# Patient Record
Sex: FEMALE | Race: WHITE | NOT HISPANIC OR LATINO
[De-identification: names, ages, dates, MRNs, and addresses within clinical notes are randomized per-mention and may not be internally consistent; named-entity substitution may affect disease eponyms.]

---

## 2019-04-29 ENCOUNTER — APPOINTMENT (OUTPATIENT)
Dept: GERIATRICS | Facility: CLINIC | Age: 84
End: 2019-04-29
Payer: MEDICARE

## 2019-04-29 PROBLEM — Z00.00 ENCOUNTER FOR PREVENTIVE HEALTH EXAMINATION: Status: ACTIVE | Noted: 2019-04-29

## 2019-04-29 PROCEDURE — 99213 OFFICE O/P EST LOW 20 MIN: CPT

## 2019-04-29 NOTE — PHYSICAL EXAM
[General Appearance - Alert] : alert [General Appearance - In No Acute Distress] : in no acute distress [General Appearance - Well Nourished] : well nourished [General Appearance - Well Developed] : well developed [Sclera] : the sclera and conjunctiva were normal [PERRL With Normal Accommodation] : pupils were equal in size, round, and reactive to light [Tissue Injury Abrasion] : wound was noted [___cm] : a [unfilled] cm [Location] : which was located [Lesions Elbows Left] : on the left elbow [Skin Injury 1] : Skin injury: [Details] : the wound [FreeTextEntry2] : light slough in wound bed (25% slough, 75% grnulation) [FreeTextEntry3] : intact margins, intact periwound, fading ecchymosis to elbow

## 2019-04-29 NOTE — HISTORY OF PRESENT ILLNESS
[FreeTextEntry1] : Tripped and fell one week ago with skin tear left elbow\par has been coming to [facility] for every 2 to 3 days for wound\par care. Using xeroform\par No pain, no fever

## 2019-04-29 NOTE — ASSESSMENT
[FreeTextEntry1] : Change to medihoney daily\par Instructed pt on how to do this independently.\par Although she does have a tremor, she is a&O and was able to return\par demonstrate satisfactorily

## 2019-04-30 ENCOUNTER — CHART COPY (OUTPATIENT)
Age: 84
End: 2019-04-30

## 2019-04-30 DIAGNOSIS — Z86.69 PERSONAL HISTORY OF OTHER DISEASES OF THE NERVOUS SYSTEM AND SENSE ORGANS: ICD-10-CM

## 2019-05-01 ENCOUNTER — APPOINTMENT (OUTPATIENT)
Dept: GERIATRICS | Facility: CLINIC | Age: 84
End: 2019-05-01
Payer: MEDICARE

## 2019-05-01 VITALS — SYSTOLIC BLOOD PRESSURE: 140 MMHG | RESPIRATION RATE: 18 BRPM | HEART RATE: 60 BPM | DIASTOLIC BLOOD PRESSURE: 70 MMHG

## 2019-05-01 PROCEDURE — 99213 OFFICE O/P EST LOW 20 MIN: CPT

## 2019-05-03 NOTE — PHYSICAL EXAM
[General Appearance - Alert] : alert [Heart Rate And Rhythm] : heart rate was normal and rhythm regular [General Appearance - In No Acute Distress] : in no acute distress [Heart Sounds] : normal S1 and S2 [Skin Injury 1] : Skin injury: [Tissue Injury Abrasion] : wound was noted [Location] : which was located [Lesions Elbows Left] : on the left elbow [___cm] : a [unfilled] cm [Details] : the wound [FreeTextEntry2] : 95% granulation, improved [FreeTextEntry3] : intact margins, intact periwound, fading ecchymosis to elbow

## 2019-05-03 NOTE — ASSESSMENT
[FreeTextEntry1] : Cleansed wound which has responded well to gentle debridement and is smaller in size\par Pt instructed on wound care again and can do this independently , may shower\par call NP if any concerns or slough returns\par She verbalized understanding

## 2019-05-06 ENCOUNTER — APPOINTMENT (OUTPATIENT)
Dept: GERIATRICS | Facility: CLINIC | Age: 84
End: 2019-05-06
Payer: MEDICARE

## 2019-05-06 VITALS — DIASTOLIC BLOOD PRESSURE: 70 MMHG | RESPIRATION RATE: 20 BRPM | SYSTOLIC BLOOD PRESSURE: 130 MMHG | HEART RATE: 66 BPM

## 2019-05-06 PROCEDURE — 99213 OFFICE O/P EST LOW 20 MIN: CPT

## 2019-05-06 NOTE — PHYSICAL EXAM
[General Appearance - Alert] : alert [General Appearance - In No Acute Distress] : in no acute distress [Skin Injury 1] : Skin injury: [Tissue Injury Abrasion] : wound was noted [___cm] : a [unfilled] cm [Location] : which was located [Lesions Elbows Left] : on the left elbow [Details] : the wound [FreeTextEntry2] : 100% granulation [FreeTextEntry3] : intact margins, intact periwound

## 2019-05-06 NOTE — ASSESSMENT
[FreeTextEntry1] : Cleanse with NS, apply bacitracin cover with dpd. Pt can do this independently. Wound is nearly healed

## 2019-10-15 ENCOUNTER — APPOINTMENT (OUTPATIENT)
Dept: GERIATRICS | Facility: CLINIC | Age: 84
End: 2019-10-15

## 2019-10-28 ENCOUNTER — APPOINTMENT (OUTPATIENT)
Dept: GERIATRICS | Facility: CLINIC | Age: 84
End: 2019-10-28
Payer: MEDICARE

## 2019-10-28 VITALS
DIASTOLIC BLOOD PRESSURE: 70 MMHG | TEMPERATURE: 97.9 F | HEART RATE: 66 BPM | SYSTOLIC BLOOD PRESSURE: 160 MMHG | RESPIRATION RATE: 20 BRPM
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
## 2019-10-28 DIAGNOSIS — S51.002A UNSPECIFIED OPEN WOUND OF LEFT ELBOW, INITIAL ENCOUNTER: ICD-10-CM

## 2019-10-28 PROCEDURE — 99213 OFFICE O/P EST LOW 20 MIN: CPT

## 2019-10-28 RX ORDER — BISMUTH SUBSALICYLATE 262 MG
262 TABLET,CHEWABLE ORAL 3 TIMES DAILY
Qty: 1 | Refills: 2 | Status: COMPLETED | COMMUNITY
Start: 2019-10-28 | End: 2019-11-18

## 2019-10-28 NOTE — HISTORY OF PRESENT ILLNESS
[FreeTextEntry1] : Has been taking 1/2 tab 5mg oxy from 1-4 daily for\par compression fracture of upper back\par Has been to her pcp and a pain \par Took 2 Aleve this am and then started to have\par stomach pain\par

## 2019-10-28 NOTE — ASSESSMENT
[FreeTextEntry1] : Discussed with pt, s/e of Aleve and likely she is reacting to it now\par Also, s/e of Oxycodone and possiblity of addiction - to reduce and eliminate\par using it. May benefit from PT but should speak with her orthopedist and pain mgmt\par doctors about this.

## 2019-10-28 NOTE — REVIEW OF SYSTEMS
[As Noted in HPI] : as noted in HPI [Negative] : Respiratory [Fever] : no fever [Chills] : no chills [Chest Pain] : no chest pain [Palpitations] : no palpitations [FreeTextEntry5] : no arm or radiating pain [FreeTextEntry7] : has had 3-4 nl bm in the past 48 hrs. Does have abdominal nausea and discomfort.. one episode of vomiting [FreeTextEntry9] : Pain in upper left back due to compression fx

## 2019-10-28 NOTE — PHYSICAL EXAM
[General Appearance - Alert] : alert [General Appearance - In No Acute Distress] : in no acute distress [Sclera] : the sclera and conjunctiva were normal [PERRL With Normal Accommodation] : pupils were equal in size, round, and reactive to light [] : no respiratory distress [Respiration, Rhythm And Depth] : normal respiratory rhythm and effort [Apical Impulse] : the apical impulse was normal [Heart Rate And Rhythm] : heart rate was normal and rhythm regular [No CVA Tenderness] : no ~M costovertebral angle tenderness [FreeTextEntry1] : using a walker

## 2019-11-04 ENCOUNTER — APPOINTMENT (OUTPATIENT)
Dept: GERIATRICS | Facility: CLINIC | Age: 84
End: 2019-11-04
Payer: MEDICARE

## 2019-11-04 PROCEDURE — 90686 IIV4 VACC NO PRSV 0.5 ML IM: CPT

## 2019-11-04 PROCEDURE — G0008: CPT

## 2022-11-16 ENCOUNTER — APPOINTMENT (OUTPATIENT)
Dept: GERIATRICS | Facility: CLINIC | Age: 87
End: 2022-11-16

## 2022-11-16 ENCOUNTER — NON-APPOINTMENT (OUTPATIENT)
Age: 87
End: 2022-11-16

## 2022-11-16 VITALS
SYSTOLIC BLOOD PRESSURE: 162 MMHG | RESPIRATION RATE: 20 BRPM | BODY MASS INDEX: 21.97 KG/M2 | HEIGHT: 67 IN | HEART RATE: 70 BPM | TEMPERATURE: 97.5 F | DIASTOLIC BLOOD PRESSURE: 80 MMHG | WEIGHT: 140 LBS

## 2022-11-16 VITALS
RESPIRATION RATE: 20 BRPM | BODY MASS INDEX: 21.97 KG/M2 | WEIGHT: 140 LBS | SYSTOLIC BLOOD PRESSURE: 162 MMHG | TEMPERATURE: 97.5 F | HEART RATE: 70 BPM | HEIGHT: 67 IN | DIASTOLIC BLOOD PRESSURE: 80 MMHG

## 2022-11-16 DIAGNOSIS — Z23 ENCOUNTER FOR IMMUNIZATION: ICD-10-CM

## 2022-11-16 DIAGNOSIS — R23.8 OTHER SKIN CHANGES: ICD-10-CM

## 2022-11-16 DIAGNOSIS — Z87.898 PERSONAL HISTORY OF OTHER SPECIFIED CONDITIONS: ICD-10-CM

## 2022-11-16 DIAGNOSIS — M54.9 DORSALGIA, UNSPECIFIED: ICD-10-CM

## 2022-11-16 DIAGNOSIS — L71.9 ROSACEA, UNSPECIFIED: ICD-10-CM

## 2022-11-16 DIAGNOSIS — H04.123 DRY EYE SYNDROME OF BILATERAL LACRIMAL GLANDS: ICD-10-CM

## 2022-11-16 PROCEDURE — 99213 OFFICE O/P EST LOW 20 MIN: CPT

## 2022-11-16 RX ORDER — OXYCODONE 5 MG/1
5 TABLET ORAL
Qty: 120 | Refills: 0 | Status: DISCONTINUED | COMMUNITY
Start: 2019-10-18 | End: 2022-11-16

## 2022-11-16 RX ORDER — AMOXICILLIN 500 MG/1
500 CAPSULE ORAL
Qty: 20 | Refills: 0 | Status: ACTIVE | COMMUNITY
Start: 2022-06-27

## 2022-11-16 RX ORDER — OMEPRAZOLE MAGNESIUM 40 MG/1
40 CAPSULE, DELAYED RELEASE ORAL
Refills: 0 | Status: DISCONTINUED | COMMUNITY
End: 2022-11-16

## 2022-11-16 RX ORDER — ACETAMINOPHEN 500 MG/1
500 TABLET ORAL AS DIRECTED
Qty: 50 | Refills: 1 | Status: DISCONTINUED | COMMUNITY
Start: 2019-10-28 | End: 2022-11-16

## 2022-11-16 NOTE — PHYSICAL EXAM
[EOMI] : extraocular movements were intact [FreeTextEntry1] : mildly injected, a little watery diischarge [de-identified] : emerging pimple on tip of nose with dark erythema surrounding. Cheeks are reddened as well as forehead

## 2022-11-16 NOTE — ASSESSMENT
[FreeTextEntry1] : for pimple/infection on nose - mupirocin after warm compress x 7d\par changed her eyedrops to refresh from systane and feels they are not working as well \par rec change back to prior drops pt is seeing her opthalmologist in upcoming week\par does have f/u with her pcp\par

## 2022-11-17 ENCOUNTER — APPOINTMENT (OUTPATIENT)
Dept: GERIATRICS | Facility: CLINIC | Age: 87
End: 2022-11-17

## 2022-12-05 ENCOUNTER — RESULT REVIEW (OUTPATIENT)
Age: 87
End: 2022-12-05

## 2022-12-09 ENCOUNTER — TRANSCRIPTION ENCOUNTER (OUTPATIENT)
Age: 87
End: 2022-12-09

## 2022-12-21 DIAGNOSIS — M51.16 INTERVERTEBRAL DISC DISORDERS WITH RADICULOPATHY, LUMBAR REGION: ICD-10-CM

## 2022-12-23 ENCOUNTER — APPOINTMENT (OUTPATIENT)
Dept: PAIN MANAGEMENT | Facility: CLINIC | Age: 87
End: 2022-12-23

## 2022-12-28 ENCOUNTER — APPOINTMENT (OUTPATIENT)
Dept: PAIN MANAGEMENT | Facility: CLINIC | Age: 87
End: 2022-12-28

## 2023-03-23 ENCOUNTER — APPOINTMENT (OUTPATIENT)
Dept: GERIATRICS | Facility: CLINIC | Age: 88
End: 2023-03-23
Payer: MEDICARE

## 2023-03-23 VITALS
HEIGHT: 67 IN | OXYGEN SATURATION: 94 % | BODY MASS INDEX: 20.72 KG/M2 | TEMPERATURE: 97 F | HEART RATE: 76 BPM | SYSTOLIC BLOOD PRESSURE: 154 MMHG | DIASTOLIC BLOOD PRESSURE: 82 MMHG | WEIGHT: 132 LBS

## 2023-03-23 DIAGNOSIS — Z71.89 OTHER SPECIFIED COUNSELING: ICD-10-CM

## 2023-03-23 DIAGNOSIS — Z80.0 FAMILY HISTORY OF MALIGNANT NEOPLASM OF DIGESTIVE ORGANS: ICD-10-CM

## 2023-03-23 DIAGNOSIS — Z82.49 FAMILY HISTORY OF ISCHEMIC HEART DISEASE AND OTHER DISEASES OF THE CIRCULATORY SYSTEM: ICD-10-CM

## 2023-03-23 PROCEDURE — 99205 OFFICE O/P NEW HI 60 MIN: CPT | Mod: 25

## 2023-03-23 PROCEDURE — 99497 ADVNCD CARE PLAN 30 MIN: CPT

## 2023-03-23 NOTE — SOCIAL HISTORY
No [Alone] : lives alone [Assisted living] : in assisted living [FreeTextEntry1] : independent living at [facility]

## 2023-03-23 NOTE — HISTORY OF PRESENT ILLNESS
[No falls in past year] : Patient reported no falls in the past year [Completely Independent] : Completely independent. [Walker] : walker [Smoke Detector] : smoke detector [Carbon Monoxide Detector] : carbon monoxide detector [Stair Lift] : stair lift used in home [Grab Bars] : grab bars [Shower Chair] : shower chair [Night Light] : night light [Anti-Slip Measures] : anti-slip measures [Wears Seat Belt] : wears seat belt [0] : 2) Feeling down, depressed, or hopeless: Not at all (0) [PHQ-2 Negative - No further assessment needed] : PHQ-2 Negative - No further assessment needed [With Patient/Caregiver] : , with patient/caregiver [Reviewed updated] : Reviewed, updated [Designated Healthcare Proxy] : Designated healthcare proxy [DNR] : DNR [I will adhere to the patient's wishes.] : I will adhere to the patient's wishes. [Time Spent: ___ minutes] : Time Spent: [unfilled] minutes [FreeTextEntry1] : 89F PMH R eye melanoma establishing care.  PCP was Dr. Erica Goldberger at [facility].  She was very happy with care but as she lives in independent living  at [facility] she is looking for a physician who can admit on clearwater.  \par \par Melanoma was found by ophthalmologist ([place]) and now she annual surveillance of abdomen with an MRI of the abdomen.  \par Now seeing Dr Noel (eye cancer) [facility].\par \par Does use walker s/p 2 different compression fractures.  \par \par HCP\par son Dennis CT\par luis Fraser (NP) [place]\par \par 2 sons Bipin & Tyler both in [place]\par \par \par  [Driving Concerns] : not driving or driving without noted concerns [de-identified] : lives at [facility] [[id number]] : 0 [AdvancecareDate] : 03/23/23

## 2023-03-23 NOTE — ASSESSMENT
[FreeTextEntry1] : establishing care \par will need an annual once a year\par will need to get records from PCP and Dr Noel\krys MCDONALD updated - open to bipap and hospitalizations\par

## 2023-03-23 NOTE — REVIEW OF SYSTEMS
[Fever] : no fever [Chills] : no chills [Eyesight Problems] : no eyesight problems [Discharge From Eyes] : no purulent discharge from the eyes [Earache] : no earache [Nosebleeds] : no nosebleeds [Chest Pain] : no chest pain [Palpitations] : no palpitations [Cough] : no cough [SOB on Exertion] : no shortness of breath during exertion [Constipation] : no constipation [Diarrhea] : no diarrhea [Dysuria] : no dysuria [Incontinence] : no incontinence [Joint Swelling] : no joint swelling [Joint Stiffness] : no joint stiffness [Itching] : no itching [Change In A Mole] : no change in a mole [Confused] : no confusion [Convulsions] : no convulsions [Anxiety] : no anxiety [Depression] : no depression

## 2023-03-23 NOTE — PHYSICAL EXAM
[Alert] : alert [Well Nourished] : well nourished [No Acute Distress] : in no acute distress [Well Developed] : well developed [Sclera] : the sclera and conjunctiva were normal [EOMI] : extraocular movements were intact [PERRL] : pupils were equal in size, round, and reactive to light [Normal Oral Mucosa] : normal oral mucosa [No Oral Pallor] : no oral pallor [Normal Appearance] : the appearance of the neck was normal [No Neck Mass] : no neck mass was observed [Supple] : the neck was supple [No Respiratory Distress] : no respiratory distress [No Acc Muscle Use] : no accessory muscle use [Respiration, Rhythm And Depth] : normal respiratory rhythm and effort [Auscultation Breath Sounds / Voice Sounds] : lungs were clear to auscultation bilaterally [Normal PMI] : the apical impulse was abnormal [Normal S1, S2] : normal S1 and S2 [Heart Sounds Gallop] : no gallops [No Rubs] : no pericardial rub [Bowel Sounds] : normal bowel sounds [Abdomen Tenderness] : non-tender [Abdomen Soft] : soft [No Joint Swelling] : no joint swelling seen [Normal Color / Pigmentation] : normal skin color and pigmentation [] : no rash [Normal Turgor] : normal skin turgor [Skin Lesions] : no skin lesions [Normal Affect] : the affect was normal [Normal Mood] : the mood was normal

## 2023-03-28 LAB
25(OH)D3 SERPL-MCNC: 40.9 NG/ML
ALBUMIN SERPL ELPH-MCNC: 4.5 G/DL
ALP BLD-CCNC: 93 U/L
ALT SERPL-CCNC: 11 U/L
ANION GAP SERPL CALC-SCNC: 13 MMOL/L
APPEARANCE: CLEAR
AST SERPL-CCNC: 19 U/L
BASOPHILS # BLD AUTO: 0.05 K/UL
BASOPHILS NFR BLD AUTO: 0.8 %
BILIRUB SERPL-MCNC: 0.6 MG/DL
BILIRUBIN URINE: NEGATIVE
BLOOD URINE: NEGATIVE
BUN SERPL-MCNC: 22 MG/DL
CALCIUM SERPL-MCNC: 9.6 MG/DL
CHLORIDE SERPL-SCNC: 104 MMOL/L
CHOLEST SERPL-MCNC: 188 MG/DL
CO2 SERPL-SCNC: 26 MMOL/L
COLOR: YELLOW
CREAT SERPL-MCNC: 0.84 MG/DL
EGFR: 65 ML/MIN/1.73M2
EOSINOPHIL # BLD AUTO: 0.11 K/UL
EOSINOPHIL NFR BLD AUTO: 1.9 %
GLUCOSE QUALITATIVE U: NEGATIVE
GLUCOSE SERPL-MCNC: 100 MG/DL
HCT VFR BLD CALC: 48.4 %
HDLC SERPL-MCNC: 83 MG/DL
HGB BLD-MCNC: 15.4 G/DL
IMM GRANULOCYTES NFR BLD AUTO: 0.3 %
KETONES URINE: NEGATIVE
LDLC SERPL CALC-MCNC: 90 MG/DL
LEUKOCYTE ESTERASE URINE: NEGATIVE
LYMPHOCYTES # BLD AUTO: 1.07 K/UL
LYMPHOCYTES NFR BLD AUTO: 18.1 %
MAN DIFF?: NORMAL
MCHC RBC-ENTMCNC: 29 PG
MCHC RBC-ENTMCNC: 31.8 GM/DL
MCV RBC AUTO: 91.1 FL
MONOCYTES # BLD AUTO: 0.41 K/UL
MONOCYTES NFR BLD AUTO: 6.9 %
NEUTROPHILS # BLD AUTO: 4.26 K/UL
NEUTROPHILS NFR BLD AUTO: 72 %
NITRITE URINE: NEGATIVE
NONHDLC SERPL-MCNC: 104 MG/DL
PH URINE: 7
PLATELET # BLD AUTO: 122 K/UL
POTASSIUM SERPL-SCNC: 4.7 MMOL/L
PROT SERPL-MCNC: 6.7 G/DL
PROTEIN URINE: NORMAL
RBC # BLD: 5.31 M/UL
RBC # FLD: 14.7 %
SODIUM SERPL-SCNC: 142 MMOL/L
SPECIFIC GRAVITY URINE: 1.02
TRIGL SERPL-MCNC: 72 MG/DL
TSH SERPL-ACNC: 0.91 UIU/ML
UROBILINOGEN URINE: NORMAL
VIT B12 SERPL-MCNC: 556 PG/ML
WBC # FLD AUTO: 5.92 K/UL

## 2023-04-11 ENCOUNTER — APPOINTMENT (OUTPATIENT)
Dept: GERIATRICS | Facility: CLINIC | Age: 88
End: 2023-04-11
Payer: MEDICARE

## 2023-04-11 PROCEDURE — 99442: CPT | Mod: 95

## 2023-04-11 NOTE — ASSESSMENT
[FreeTextEntry1] : reviewed MRI findings\par reassurance given\par cardiomegaly will have EKG at next visit\par if any symptoms would refer to cardiology

## 2023-04-11 NOTE — HISTORY OF PRESENT ILLNESS
[FreeTextEntry1] : Had MRI of abdomen for screening for melanoma surveillance \par no signs of metastasis\par she had concerns about her MRI\par cardiomegaly\par renal cyst\par compression fractures\par hernia [No falls in past year] : Patient reported no falls in the past year [Completely Independent] : Completely independent. [Smoke Detector] : smoke detector [0] : 2) Feeling down, depressed, or hopeless: Not at all (0) [PHQ-2 Negative - No further assessment needed] : PHQ-2 Negative - No further assessment needed [[id number]] : 0

## 2023-04-11 NOTE — REASON FOR VISIT
[Home] : at home, [unfilled] , at the time of the visit. [Medical Office: ([facility])___] : at the medical office located in  [Verbal consent obtained from patient] : the patient, [unfilled] [Acute] : an acute visit

## 2023-07-26 ENCOUNTER — APPOINTMENT (OUTPATIENT)
Dept: GERIATRICS | Facility: CLINIC | Age: 88
End: 2023-07-26
Payer: MEDICARE

## 2023-07-26 ENCOUNTER — NON-APPOINTMENT (OUTPATIENT)
Age: 88
End: 2023-07-26

## 2023-07-26 VITALS
BODY MASS INDEX: 21.46 KG/M2 | TEMPERATURE: 97.3 F | DIASTOLIC BLOOD PRESSURE: 64 MMHG | WEIGHT: 137 LBS | OXYGEN SATURATION: 97 % | HEART RATE: 72 BPM | SYSTOLIC BLOOD PRESSURE: 140 MMHG

## 2023-07-26 DIAGNOSIS — Z23 ENCOUNTER FOR IMMUNIZATION: ICD-10-CM

## 2023-07-26 DIAGNOSIS — C43.9 MALIGNANT MELANOMA OF SKIN, UNSPECIFIED: ICD-10-CM

## 2023-07-26 PROCEDURE — 90715 TDAP VACCINE 7 YRS/> IM: CPT | Mod: GY

## 2023-07-26 PROCEDURE — 90471 IMMUNIZATION ADMIN: CPT

## 2023-07-26 PROCEDURE — G0439: CPT

## 2023-07-26 RX ORDER — MUPIROCIN 20 MG/G
2 OINTMENT TOPICAL TWICE DAILY
Qty: 1 | Refills: 0 | Status: COMPLETED | COMMUNITY
Start: 2022-11-16 | End: 2023-07-26

## 2023-07-26 RX ORDER — AZELASTINE HYDROCHLORIDE 137 UG/1
137 SPRAY, METERED NASAL
Qty: 30 | Refills: 0 | Status: COMPLETED | COMMUNITY
Start: 2022-11-03 | End: 2023-07-26

## 2023-07-26 RX ORDER — FAMOTIDINE 40 MG/1
40 TABLET, FILM COATED ORAL
Qty: 90 | Refills: 0 | Status: COMPLETED | COMMUNITY
Start: 2022-09-02 | End: 2023-07-26

## 2023-07-28 PROBLEM — C43.9 MELANOMA: Status: ACTIVE | Noted: 2023-04-11

## 2023-07-28 NOTE — PHYSICAL EXAM
[Alert] : alert [Well Nourished] : well nourished [Well Developed] : well developed [Sclera] : the sclera and conjunctiva were normal [EOMI] : extraocular movements were intact [PERRL] : pupils were equal in size, round, and reactive to light [Normal Oral Mucosa] : normal oral mucosa [No Oral Pallor] : no oral pallor [No Neck Mass] : no neck mass was observed [Supple] : the neck was supple [No Respiratory Distress] : no respiratory distress [No Acc Muscle Use] : no accessory muscle use [Respiration, Rhythm And Depth] : normal respiratory rhythm and effort [Auscultation Breath Sounds / Voice Sounds] : lungs were clear to auscultation bilaterally [Normal PMI] : the apical impulse was abnormal [Normal S1, S2] : normal S1 and S2 [Heart Sounds Gallop] : no gallops [No Rubs] : no pericardial rub [Normal Appearance] : normal in appearance [Breast Palpation Mass] : no palpable masses [No Nipple Discharge] : no nipple discharge [Bowel Sounds] : normal bowel sounds [Abdomen Tenderness] : non-tender [Abdomen Soft] : soft [Cervical Lymph Nodes Enlarged Posterior Bilaterally] : posterior cervical [Supraclavicular Lymph Nodes Enlarged Bilaterally] : supraclavicular [Cervical Lymph Nodes Enlarged Anterior Bilaterally] : anterior cervical, supraclavicular [No Joint Swelling] : no joint swelling seen [Normal Color / Pigmentation] : normal skin color and pigmentation [] : no rash [Normal Turgor] : normal skin turgor [Skin Lesions] : no skin lesions [Normal Affect] : the affect was normal [Normal Mood] : the mood was normal

## 2023-07-28 NOTE — HISTORY OF PRESENT ILLNESS
[No falls in past year] : Patient reported no falls in the past year [Completely Independent] : Completely independent. [Independent] : meal preparation [] : doing laundry [Cane] : cane [Walker] : walker [Smoke Detector] : smoke detector [Carbon Monoxide Detector] : carbon monoxide detector [PMH Reviewed and Updated] : past medical history reviewed and updated [PSH Reviewed and Updated] : past surgical history reviewed and updated [Family History Reviewed and Updated] : family history reviewed and updated [Medication and Allergies Reconciled] : medication and allergies reconciled [0] : 0 [Over the Past 2 Weeks, Have You Felt Down, Depressed, or Hopeless?] : 1.) Over the past 2 weeks, have you felt down, depressed, or hopeless? No [Over the Past 2 Weeks, Have You Felt Little Interest or Pleasure Doing Things?] : 2.) Over the past 2 weeks, have you felt little interest or pleasure doing things? No [Retired] : retired from work [Never] : has never used illicit drugs [Compliant with medications] : compliant with medications [Unable To Manage Meds] : ability to manage ~his/her~ medications [Adequate] : adequate [Fully Independent] : fully independent [No history of falls] : no history of falls [Seatbelts] : seatbelts [FreeTextEntry1] : 89F PMH R eye melanoma establishing care. PCP was Dr. Erica Goldberger at [facility]. She was very happy with care but as she lives in independent living at [facility] she is looking for a physician who can admit on [place]. \par \par Melanoma was found by ophthalmologist ([place]) and now she annual surveillance of abdomen with an MRI of the abdomen. \par Now seeing Dr Noel (eye cancer) [facility].\par \par Does use walker s/p 2 different compression fractures. \par \par HCP\par son Dennis CT\par luis Fraser (NP) [facility]\par \par 2 sons Bipin & Tyler both in [place] [PHQ-2 Negative - No further assessment needed] : PHQ-2 Negative - No further assessment needed [[id number]] : 0

## 2023-07-28 NOTE — ASSESSMENT
[FreeTextEntry1] : establishing care \par did have labs prior this year\par nystatin to breast\par Tdap given today\par \par 89 not wanting extensive testing ie mammogram or bone density \par \par

## 2023-10-22 ENCOUNTER — RX RENEWAL (OUTPATIENT)
Age: 88
End: 2023-10-22

## 2024-01-03 ENCOUNTER — APPOINTMENT (OUTPATIENT)
Dept: GERIATRICS | Facility: CLINIC | Age: 89
End: 2024-01-03
Payer: MEDICARE

## 2024-01-03 VITALS
DIASTOLIC BLOOD PRESSURE: 72 MMHG | SYSTOLIC BLOOD PRESSURE: 150 MMHG | OXYGEN SATURATION: 96 % | TEMPERATURE: 97.7 F | RESPIRATION RATE: 20 BRPM | HEART RATE: 69 BPM

## 2024-01-03 PROCEDURE — 99214 OFFICE O/P EST MOD 30 MIN: CPT

## 2024-01-03 RX ORDER — NYSTATIN AND TRIAMCINOLONE ACETONIDE 100000; 1 [USP'U]/G; MG/G
100000-0.1 OINTMENT TOPICAL
Qty: 1 | Refills: 1 | Status: COMPLETED | COMMUNITY
Start: 2023-07-26 | End: 2024-01-03

## 2024-01-03 RX ORDER — MUPIROCIN 20 MG/G
2 OINTMENT TOPICAL
Qty: 1 | Refills: 1 | Status: COMPLETED | COMMUNITY
Start: 2024-01-03 | End: 2024-01-31

## 2024-01-03 NOTE — ASSESSMENT
[FreeTextEntry1] : Cleansed wound, applied mupirocin, xeroform, gauze and  wrap  Due to complex nature of clinical exam of wound and length of time since trauma,  it would be prudent to get immediate surgical attn to the wound to avoid any further infectious process Attempted to get urgent appt at wound center was unsuccessful and this is not available until next week   Will send to ED

## 2024-01-03 NOTE — PHYSICAL EXAM
[Alert] : alert [No Acute Distress] : in no acute distress [Normal Outer Ear/Nose] : the ears and nose were normal in appearance [Normal Appearance] : the appearance of the neck was normal [Supple] : the neck was supple [No Respiratory Distress] : no respiratory distress [No Acc Muscle Use] : no accessory muscle use [Respiration, Rhythm And Depth] : normal respiratory rhythm and effort [Auscultation Breath Sounds / Voice Sounds] : lungs were clear to auscultation bilaterally [Heart Rate And Rhythm] : heart rate was normal and rhythm regular [Bowel Sounds] : normal bowel sounds [Abdomen Tenderness] : non-tender [Abdomen Soft] : soft [Normal Color / Pigmentation] : normal skin color and pigmentation [Normal Turgor] : normal skin turgor [No Focal Deficits] : no focal deficits [Normal Affect] : the affect was normal [Normal Mood] : the mood was normal [de-identified] : pain on palpation of periwound, no warmth, no edema [de-identified] : 3x4 wound on left shin with stasis hematoma, and possible structure exposure or necrosis within wound, white hard area

## 2024-01-04 ENCOUNTER — APPOINTMENT (OUTPATIENT)
Dept: GERIATRICS | Facility: CLINIC | Age: 89
End: 2024-01-04
Payer: MEDICARE

## 2024-01-04 VITALS
SYSTOLIC BLOOD PRESSURE: 120 MMHG | HEART RATE: 79 BPM | TEMPERATURE: 98.2 F | RESPIRATION RATE: 20 BRPM | OXYGEN SATURATION: 95 % | DIASTOLIC BLOOD PRESSURE: 60 MMHG

## 2024-01-04 DIAGNOSIS — S22.089A UNSPECIFIED FRACTURE OF T11-T12 VERTEBRA, INITIAL ENCOUNTER FOR CLOSED FRACTURE: ICD-10-CM

## 2024-01-04 DIAGNOSIS — B37.2 CANDIDIASIS OF SKIN AND NAIL: ICD-10-CM

## 2024-01-04 PROCEDURE — 99213 OFFICE O/P EST LOW 20 MIN: CPT

## 2024-01-04 NOTE — HISTORY OF PRESENT ILLNESS
[FreeTextEntry1] : banged her leg against furniture on 12/21/23 while visiting family in [place]  recent tdap on 7/26/23  seen at ED on 1/3/24 where xray showed no bone involvement

## 2024-01-04 NOTE — PHYSICAL EXAM
[Alert] : alert [Normal Outer Ear/Nose] : the ears and nose were normal in appearance [Normal Appearance] : the appearance of the neck was normal [Supple] : the neck was supple [No Respiratory Distress] : no respiratory distress [Respiration, Rhythm And Depth] : normal respiratory rhythm and effort [Heart Rate And Rhythm] : heart rate was normal and rhythm regular [Normal Color / Pigmentation] : normal skin color and pigmentation [Normal Turgor] : normal skin turgor [No Focal Deficits] : no focal deficits [Oriented To Time, Place, And Person] : oriented to person, place, and time [Normal Affect] : the affect was normal [Normal Mood] : the mood was normal [de-identified] : 3x4 wound on left shin, hematoma has been resorbed/debrided at ED, still with small 0.5 white area within wound bed, periwound no edema, no warmth

## 2024-01-08 ENCOUNTER — APPOINTMENT (OUTPATIENT)
Dept: GERIATRICS | Facility: CLINIC | Age: 89
End: 2024-01-08
Payer: MEDICARE

## 2024-01-08 VITALS
OXYGEN SATURATION: 95 % | HEART RATE: 80 BPM | TEMPERATURE: 97.4 F | DIASTOLIC BLOOD PRESSURE: 60 MMHG | RESPIRATION RATE: 18 BRPM | SYSTOLIC BLOOD PRESSURE: 118 MMHG

## 2024-01-08 DIAGNOSIS — M81.0 AGE-RELATED OSTEOPOROSIS W/OUT CURRENT PATHOLOGICAL FRACTURE: ICD-10-CM

## 2024-01-08 DIAGNOSIS — R26.2 DIFFICULTY IN WALKING, NOT ELSEWHERE CLASSIFIED: ICD-10-CM

## 2024-01-08 DIAGNOSIS — M15.9 POLYOSTEOARTHRITIS, UNSPECIFIED: ICD-10-CM

## 2024-01-08 PROCEDURE — 99214 OFFICE O/P EST MOD 30 MIN: CPT

## 2024-01-08 NOTE — HISTORY OF PRESENT ILLNESS
[FreeTextEntry1] : banged her leg against furniture on 12/21/23 while visiting family in [place]  recent tdap on 7/26/23  seen at ED on 1/3/24 where xray showed no bone involvement  has been doing wound care herself over the past week with no complications

## 2024-01-08 NOTE — ASSESSMENT
[FreeTextEntry1] : Using a cane with an unsteady gait. filled out a handicapped form due to patient still drives locally and has difficulty walking any distances on street/parking lots  wound care done in office.  Pt will continue same every other day and as needed

## 2024-01-08 NOTE — PHYSICAL EXAM
[Alert] : alert [Normal Outer Ear/Nose] : the ears and nose were normal in appearance [Normal Appearance] : the appearance of the neck was normal [Supple] : the neck was supple [No Respiratory Distress] : no respiratory distress [Respiration, Rhythm And Depth] : normal respiratory rhythm and effort [Heart Rate And Rhythm] : heart rate was normal and rhythm regular [Normal Color / Pigmentation] : normal skin color and pigmentation [Normal Turgor] : normal skin turgor [No Focal Deficits] : no focal deficits [Oriented To Time, Place, And Person] : oriented to person, place, and time [Normal Affect] : the affect was normal [Normal Mood] : the mood was normal [de-identified] : 3x3 wound on left shin,smaller, fully granualting with no further white areas, periwound with erythema and somewhat tender, no edema, no warmth

## 2024-01-10 ENCOUNTER — NON-APPOINTMENT (OUTPATIENT)
Age: 89
End: 2024-01-10

## 2024-01-16 ENCOUNTER — APPOINTMENT (OUTPATIENT)
Dept: GERIATRICS | Facility: CLINIC | Age: 89
End: 2024-01-16
Payer: MEDICARE

## 2024-01-16 VITALS
HEART RATE: 72 BPM | RESPIRATION RATE: 20 BRPM | TEMPERATURE: 98 F | DIASTOLIC BLOOD PRESSURE: 70 MMHG | SYSTOLIC BLOOD PRESSURE: 124 MMHG

## 2024-01-16 DIAGNOSIS — K21.9 GASTRO-ESOPHAGEAL REFLUX DISEASE W/OUT ESOPHAGITIS: ICD-10-CM

## 2024-01-16 PROCEDURE — 99213 OFFICE O/P EST LOW 20 MIN: CPT

## 2024-01-16 RX ORDER — OMEPRAZOLE 40 MG/1
40 CAPSULE, DELAYED RELEASE ORAL
Qty: 90 | Refills: 3 | Status: DISCONTINUED | COMMUNITY
Start: 2023-07-26 | End: 2024-01-16

## 2024-01-17 NOTE — HISTORY OF PRESENT ILLNESS
[FreeTextEntry1] : banged her leg against furniture on 12/21/23 while visiting family in [place]  recent tdap on 7/26/23  seen at ED on 1/3/24 where xray showed no bone involvement  wound care done independently with mupirocin and xeroform she states after mupirocin in the office last week she developed swelling and therefore stopped the mupirocin and the swelling stopped unclear if this is an allergy or possibly related to dressing being to tight?   she also would like to resume famotidine for her GERD as she felt the omeprazole was causing her some gi s/e

## 2024-01-17 NOTE — PHYSICAL EXAM
[Alert] : alert [Normal Outer Ear/Nose] : the ears and nose were normal in appearance [Normal Appearance] : the appearance of the neck was normal [Supple] : the neck was supple [No Respiratory Distress] : no respiratory distress [Respiration, Rhythm And Depth] : normal respiratory rhythm and effort [Heart Rate And Rhythm] : heart rate was normal and rhythm regular [Normal Color / Pigmentation] : normal skin color and pigmentation [Normal Turgor] : normal skin turgor [No Focal Deficits] : no focal deficits [Oriented To Time, Place, And Person] : oriented to person, place, and time [Normal Affect] : the affect was normal [Normal Mood] : the mood was normal [de-identified] : 3x3 wound on left shin reepithializing 50% granualtion 50%, some scattered scabbing, periwound with erythema no longer tender, no edema, no warmth.

## 2024-01-23 ENCOUNTER — APPOINTMENT (OUTPATIENT)
Dept: GERIATRICS | Facility: CLINIC | Age: 89
End: 2024-01-23
Payer: MEDICARE

## 2024-01-23 VITALS
OXYGEN SATURATION: 97 % | TEMPERATURE: 97.2 F | DIASTOLIC BLOOD PRESSURE: 70 MMHG | SYSTOLIC BLOOD PRESSURE: 140 MMHG | HEART RATE: 79 BPM | RESPIRATION RATE: 18 BRPM

## 2024-01-23 DIAGNOSIS — S81.802A UNSPECIFIED OPEN WOUND, LEFT LOWER LEG, INITIAL ENCOUNTER: ICD-10-CM

## 2024-01-23 PROCEDURE — 99213 OFFICE O/P EST LOW 20 MIN: CPT

## 2024-01-23 NOTE — HISTORY OF PRESENT ILLNESS
[FreeTextEntry1] : banged her leg against furniture on 12/21/23 while visiting family in [place]  recent tdap on 7/26/23  seen at ED on 1/3/24 where xray showed no bone involvement  she also would like to resume famotidine for her GERD as she felt the omeprazole was causing her some gi s/e  Switched to medihoney last visit

## 2024-01-23 NOTE — PHYSICAL EXAM
[Alert] : alert [No Acute Distress] : in no acute distress [Normal Outer Ear/Nose] : the ears and nose were normal in appearance [Normal Appearance] : the appearance of the neck was normal [Supple] : the neck was supple [No Respiratory Distress] : no respiratory distress [Respiration, Rhythm And Depth] : normal respiratory rhythm and effort [Heart Rate And Rhythm] : heart rate was normal and rhythm regular [Normal Color / Pigmentation] : normal skin color and pigmentation [Normal Turgor] : normal skin turgor [No Focal Deficits] : no focal deficits [Oriented To Time, Place, And Person] : oriented to person, place, and time [Normal Affect] : the affect was normal [Normal Mood] : the mood was normal [de-identified] : wound on left shin reepithialized. some residual scabbing no erythema in periwound, no pain on palpation

## 2024-01-23 NOTE — ASSESSMENT
[FreeTextEntry1] : cleansed wound and removed most residual scabbed areas wound is healed. Pt will apply protective dressing for a few more days.

## 2024-04-15 RX ORDER — FAMOTIDINE 40 MG/1
40 TABLET, FILM COATED ORAL
Qty: 90 | Refills: 1 | Status: ACTIVE | COMMUNITY
Start: 2024-01-16 | End: 1900-01-01

## 2024-08-01 ENCOUNTER — APPOINTMENT (OUTPATIENT)
Dept: GERIATRICS | Facility: CLINIC | Age: 89
End: 2024-08-01
Payer: MEDICARE

## 2024-08-01 ENCOUNTER — LABORATORY RESULT (OUTPATIENT)
Age: 89
End: 2024-08-01

## 2024-08-01 VITALS
BODY MASS INDEX: 21.03 KG/M2 | HEIGHT: 67 IN | WEIGHT: 134 LBS | OXYGEN SATURATION: 94 % | TEMPERATURE: 98 F | DIASTOLIC BLOOD PRESSURE: 72 MMHG | HEART RATE: 72 BPM | SYSTOLIC BLOOD PRESSURE: 130 MMHG

## 2024-08-01 DIAGNOSIS — M81.0 AGE-RELATED OSTEOPOROSIS W/OUT CURRENT PATHOLOGICAL FRACTURE: ICD-10-CM

## 2024-08-01 DIAGNOSIS — C43.9 MALIGNANT MELANOMA OF SKIN, UNSPECIFIED: ICD-10-CM

## 2024-08-01 DIAGNOSIS — R06.09 OTHER FORMS OF DYSPNEA: ICD-10-CM

## 2024-08-01 DIAGNOSIS — I51.7 CARDIOMEGALY: ICD-10-CM

## 2024-08-01 DIAGNOSIS — R54 AGE-RELATED PHYSICAL DEBILITY: ICD-10-CM

## 2024-08-01 PROCEDURE — G0439: CPT

## 2024-08-01 NOTE — ASSESSMENT
[FreeTextEntry1] : reviewed MRI findings March 2024 reassurance given COLLAZO will order EKG as our midmark is not working also noted cardiomegaly in past to get RSV now will get flu and covid at [facility] refuses bone density MOLST brought in today signed and renewed will come in once a year labs drawn in office

## 2024-08-01 NOTE — PHYSICAL EXAM
[Alert] : alert [Well Nourished] : well nourished [Well Developed] : well developed [Sclera] : the sclera and conjunctiva were normal [EOMI] : extraocular movements were intact [PERRL] : pupils were equal in size, round, and reactive to light [Normal Oral Mucosa] : normal oral mucosa [No Oral Pallor] : no oral pallor [No Neck Mass] : no neck mass was observed [Supple] : the neck was supple [No Respiratory Distress] : no respiratory distress [No Acc Muscle Use] : no accessory muscle use [Respiration, Rhythm And Depth] : normal respiratory rhythm and effort [Auscultation Breath Sounds / Voice Sounds] : lungs were clear to auscultation bilaterally [Normal PMI] : the apical impulse was normal [Normal S1, S2] : normal S1 and S2 [Heart Sounds Gallop] : no gallops [No Rubs] : no pericardial rub [Normal Appearance] : normal in appearance [Breast Palpation Mass] : no palpable masses [No Nipple Discharge] : no nipple discharge [Bowel Sounds] : normal bowel sounds [Abdomen Tenderness] : non-tender [Abdomen Soft] : soft [No Joint Swelling] : no joint swelling seen [Normal Color / Pigmentation] : normal skin color and pigmentation [] : no rash [Normal Turgor] : normal skin turgor [Skin Lesions] : no skin lesions [Normal Affect] : the affect was normal [Normal Mood] : the mood was normal

## 2024-08-01 NOTE — HISTORY OF PRESENT ILLNESS
[No falls in past year] : Patient reported no falls in the past year [Cane] : cane [Walker] : walker [Smoke Detector] : smoke detector [Carbon Monoxide Detector] : carbon monoxide detector [NO] : No [0] : 2) Feeling down, depressed, or hopeless: Not at all (0) [PMH Reviewed and Updated] : past medical history reviewed and updated [PSH Reviewed and Updated] : past surgical history reviewed and updated [Family History Reviewed and Updated] : family history reviewed and updated [Medication and Allergies Reconciled] : medication and allergies reconciled [Retired] : retired from work [None] : The patient has no concerns about alcohol abuse [Never] : has never used illicit drugs [Compliant with medications] : compliant with medications [Adequate] : adequate [Fully Independent] : fully independent [Drives without concerns] : drives without concerns [No history of falls] : no history of falls [Seatbelts] : seatbelts [Smoke Detectors] : smoke detectors [Completely Independent] : Completely independent. [PHQ-2 Negative - No further assessment needed] : PHQ-2 Negative - No further assessment needed [Over the Past 2 Weeks, Have You Felt Down, Depressed, or Hopeless?] : 1.) Over the past 2 weeks, have you felt down, depressed, or hopeless? No [Over the Past 2 Weeks, Have You Felt Little Interest or Pleasure Doing Things?] : 2.) Over the past 2 weeks, have you felt little interest or pleasure doing things? No [Unable To Manage Meds] : ability to manage ~his/her~ medications [de-identified] : 1-2 glasses of wine at a special event [FreeTextEntry1] : 89F PMH R eye melanoma. PCP was Dr. Erica Goldberger at [facility]. She was very happy with care but as she lives in independent living at [facility] she is looking for a physician who can admit on [place].   Melanoma was found by ophthalmologist ([place]) and now she annual surveillance of abdomen with an MRI of the abdomen.  Now seeing Dr Hrenandez - retinologist [place] (in the past had been working with Dr Bert Christine [facility])   Does use cane or walker s/p 2 different compression fractures.   Feels some COLLAZO at times  Turning 89 in September Takes a stretch and balance class twice a week at [facility] no chest pain or palpitations BP remains well controlled   HCP son Dennis CT niece Lashon Fraser (NP) [facility]  2 other sons Bipin & Tyler both in [place] [[id number]] : 0

## 2024-08-02 LAB
25(OH)D3 SERPL-MCNC: 38.4 NG/ML
ALBUMIN SERPL ELPH-MCNC: 4.6 G/DL
ALP BLD-CCNC: 68 U/L
ALT SERPL-CCNC: 12 U/L
ANION GAP SERPL CALC-SCNC: 12 MMOL/L
APPEARANCE: CLEAR
AST SERPL-CCNC: 22 U/L
BASOPHILS # BLD AUTO: 0.05 K/UL
BASOPHILS NFR BLD AUTO: 0.9 %
BILIRUB SERPL-MCNC: 0.7 MG/DL
BILIRUBIN URINE: NEGATIVE
BLOOD URINE: NEGATIVE
BUN SERPL-MCNC: 22 MG/DL
CALCIUM SERPL-MCNC: 9.6 MG/DL
CHLORIDE SERPL-SCNC: 102 MMOL/L
CHOLEST SERPL-MCNC: 225 MG/DL
CO2 SERPL-SCNC: 26 MMOL/L
COLOR: YELLOW
CREAT SERPL-MCNC: 1.05 MG/DL
EGFR: 49 ML/MIN/1.73M2
EOSINOPHIL # BLD AUTO: 0.12 K/UL
EOSINOPHIL NFR BLD AUTO: 2.2 %
GLUCOSE QUALITATIVE U: NEGATIVE MG/DL
GLUCOSE SERPL-MCNC: 105 MG/DL
HCT VFR BLD CALC: 49.7 %
HDLC SERPL-MCNC: 90 MG/DL
HGB BLD-MCNC: 16.1 G/DL
IMM GRANULOCYTES NFR BLD AUTO: 0.4 %
KETONES URINE: NEGATIVE MG/DL
LDLC SERPL CALC-MCNC: 122 MG/DL
LEUKOCYTE ESTERASE URINE: ABNORMAL
LYMPHOCYTES # BLD AUTO: 1.12 K/UL
LYMPHOCYTES NFR BLD AUTO: 20.9 %
MAN DIFF?: NORMAL
MCHC RBC-ENTMCNC: 30.3 PG
MCHC RBC-ENTMCNC: 32.4 GM/DL
MCV RBC AUTO: 93.4 FL
MONOCYTES # BLD AUTO: 0.35 K/UL
MONOCYTES NFR BLD AUTO: 6.5 %
NEUTROPHILS # BLD AUTO: 3.71 K/UL
NEUTROPHILS NFR BLD AUTO: 69.1 %
NITRITE URINE: NEGATIVE
NONHDLC SERPL-MCNC: 136 MG/DL
PH URINE: 6.5
PLATELET # BLD AUTO: 135 K/UL
POTASSIUM SERPL-SCNC: 5 MMOL/L
PROT SERPL-MCNC: 6.9 G/DL
PROTEIN URINE: NEGATIVE MG/DL
RBC # BLD: 5.32 M/UL
RBC # FLD: 14.8 %
SODIUM SERPL-SCNC: 141 MMOL/L
SPECIFIC GRAVITY URINE: 1.01
TRIGL SERPL-MCNC: 77 MG/DL
TSH SERPL-ACNC: 1.12 UIU/ML
UROBILINOGEN URINE: 0.2 MG/DL
VIT B12 SERPL-MCNC: 694 PG/ML
WBC # FLD AUTO: 5.37 K/UL

## 2024-08-14 RX ORDER — RESPIRATORY SYNCYTIAL VIRUS VACCINE .06; .06 MG/.5ML; MG/.5ML
120 INJECTION, POWDER, LYOPHILIZED, FOR SOLUTION INTRAMUSCULAR ONCE
Qty: 1 | Refills: 0 | Status: ACTIVE | COMMUNITY
Start: 2024-08-14 | End: 1900-01-01

## 2024-10-08 ENCOUNTER — RX RENEWAL (OUTPATIENT)
Age: 89
End: 2024-10-08

## 2025-01-13 DIAGNOSIS — Z00.00 ENCOUNTER FOR GENERAL ADULT MEDICAL EXAMINATION W/OUT ABNORMAL FINDINGS: ICD-10-CM

## 2025-01-15 ENCOUNTER — APPOINTMENT (OUTPATIENT)
Dept: ORTHOPEDIC SURGERY | Facility: CLINIC | Age: 89
End: 2025-01-15
Payer: MEDICARE

## 2025-01-15 VITALS
WEIGHT: 134 LBS | BODY MASS INDEX: 21.03 KG/M2 | SYSTOLIC BLOOD PRESSURE: 179 MMHG | HEIGHT: 67 IN | DIASTOLIC BLOOD PRESSURE: 78 MMHG | OXYGEN SATURATION: 94 % | HEART RATE: 86 BPM

## 2025-01-15 DIAGNOSIS — M17.11 UNILATERAL PRIMARY OSTEOARTHRITIS, RIGHT KNEE: ICD-10-CM

## 2025-01-15 PROCEDURE — 20610 DRAIN/INJ JOINT/BURSA W/O US: CPT | Mod: RT

## 2025-01-15 PROCEDURE — 99203 OFFICE O/P NEW LOW 30 MIN: CPT | Mod: 25

## 2025-01-15 PROCEDURE — 73564 X-RAY EXAM KNEE 4 OR MORE: CPT | Mod: RT

## 2025-01-15 PROCEDURE — 99213 OFFICE O/P EST LOW 20 MIN: CPT | Mod: 25

## 2025-02-24 ENCOUNTER — NON-APPOINTMENT (OUTPATIENT)
Age: 89
End: 2025-02-24

## 2025-03-03 ENCOUNTER — TRANSCRIPTION ENCOUNTER (OUTPATIENT)
Age: 89
End: 2025-03-03

## 2025-04-10 ENCOUNTER — RX RENEWAL (OUTPATIENT)
Age: 89
End: 2025-04-10

## 2025-04-14 ENCOUNTER — RESULT REVIEW (OUTPATIENT)
Age: 89
End: 2025-04-14

## 2025-07-16 ENCOUNTER — APPOINTMENT (OUTPATIENT)
Dept: ORTHOPEDIC SURGERY | Facility: CLINIC | Age: 89
End: 2025-07-16
Payer: MEDICARE

## 2025-07-16 VITALS
HEIGHT: 67 IN | HEART RATE: 64 BPM | BODY MASS INDEX: 20.4 KG/M2 | WEIGHT: 130 LBS | DIASTOLIC BLOOD PRESSURE: 87 MMHG | OXYGEN SATURATION: 98 % | SYSTOLIC BLOOD PRESSURE: 180 MMHG | RESPIRATION RATE: 17 BRPM

## 2025-07-16 PROCEDURE — 20610 DRAIN/INJ JOINT/BURSA W/O US: CPT | Mod: RT

## 2025-07-16 PROCEDURE — 73564 X-RAY EXAM KNEE 4 OR MORE: CPT | Mod: RT

## 2025-07-16 PROCEDURE — 99213 OFFICE O/P EST LOW 20 MIN: CPT | Mod: 25

## 2025-07-25 ENCOUNTER — APPOINTMENT (OUTPATIENT)
Dept: ORTHOPEDIC SURGERY | Facility: CLINIC | Age: 89
End: 2025-07-25

## 2025-07-25 VITALS
SYSTOLIC BLOOD PRESSURE: 165 MMHG | BODY MASS INDEX: 20.4 KG/M2 | WEIGHT: 130 LBS | HEART RATE: 70 BPM | HEIGHT: 67 IN | OXYGEN SATURATION: 92 % | DIASTOLIC BLOOD PRESSURE: 72 MMHG

## 2025-07-25 DIAGNOSIS — M17.11 UNILATERAL PRIMARY OSTEOARTHRITIS, RIGHT KNEE: ICD-10-CM

## 2025-07-25 PROCEDURE — 99213 OFFICE O/P EST LOW 20 MIN: CPT | Mod: 25

## 2025-07-25 PROCEDURE — 20610 DRAIN/INJ JOINT/BURSA W/O US: CPT | Mod: RT
